# Patient Record
Sex: MALE | Race: WHITE | Employment: OTHER | URBAN - METROPOLITAN AREA
[De-identification: names, ages, dates, MRNs, and addresses within clinical notes are randomized per-mention and may not be internally consistent; named-entity substitution may affect disease eponyms.]

---

## 2017-11-01 ENCOUNTER — HOSPITAL ENCOUNTER (OUTPATIENT)
Dept: OTHER | Age: 77
Discharge: HOME | End: 2017-11-30
Attending: INTERNAL MEDICINE | Admitting: INTERNAL MEDICINE

## 2017-12-01 ENCOUNTER — HOSPITAL ENCOUNTER (OUTPATIENT)
Dept: OTHER | Age: 77
Discharge: HOME | End: 2017-12-31
Attending: INTERNAL MEDICINE | Admitting: INTERNAL MEDICINE

## 2018-01-01 ENCOUNTER — HOSPITAL ENCOUNTER (OUTPATIENT)
Dept: OTHER | Age: 78
Discharge: HOME | End: 2018-01-31
Attending: INTERNAL MEDICINE | Admitting: INTERNAL MEDICINE

## 2018-02-01 ENCOUNTER — HOSPITAL ENCOUNTER (OUTPATIENT)
Dept: OTHER | Age: 78
Discharge: HOME | End: 2018-02-28
Attending: INTERNAL MEDICINE | Admitting: INTERNAL MEDICINE

## 2018-03-01 ENCOUNTER — HOSPITAL ENCOUNTER (OUTPATIENT)
Dept: OTHER | Age: 78
Discharge: HOME | End: 2018-03-31
Attending: INTERNAL MEDICINE | Admitting: INTERNAL MEDICINE

## 2018-04-01 ENCOUNTER — HOSPITAL ENCOUNTER (OUTPATIENT)
Dept: OTHER | Age: 78
Discharge: HOME | End: 2018-04-30
Attending: INTERNAL MEDICINE | Admitting: INTERNAL MEDICINE

## 2018-05-01 ENCOUNTER — HOSPITAL ENCOUNTER (OUTPATIENT)
Dept: OTHER | Age: 78
Discharge: HOME | End: 2018-05-31
Attending: INTERNAL MEDICINE | Admitting: INTERNAL MEDICINE

## 2018-06-01 ENCOUNTER — HOSPITAL ENCOUNTER (OUTPATIENT)
Dept: OTHER | Age: 78
Discharge: HOME | End: 2018-06-30
Attending: INTERNAL MEDICINE | Admitting: INTERNAL MEDICINE

## 2018-07-01 ENCOUNTER — HOSPITAL ENCOUNTER (OUTPATIENT)
Dept: OTHER | Age: 78
Discharge: HOME | End: 2018-07-31
Attending: INTERNAL MEDICINE | Admitting: INTERNAL MEDICINE

## 2018-08-01 ENCOUNTER — HOSPITAL ENCOUNTER (OUTPATIENT)
Dept: OTHER | Age: 78
Discharge: HOME | End: 2018-08-31
Attending: INTERNAL MEDICINE | Admitting: INTERNAL MEDICINE

## 2018-09-01 ENCOUNTER — HOSPITAL ENCOUNTER (OUTPATIENT)
Dept: OTHER | Age: 78
Discharge: HOME | End: 2018-09-01
Attending: INTERNAL MEDICINE | Admitting: INTERNAL MEDICINE

## 2018-09-28 ENCOUNTER — HOSPITAL ENCOUNTER (OUTPATIENT)
Age: 78
Discharge: HOME | End: 2018-09-28

## 2018-09-28 PROCEDURE — 9900000065 HC CARDIAC REHAB PHASE 3 - 1 VISIT

## 2018-10-01 ENCOUNTER — HOSPITAL ENCOUNTER (OUTPATIENT)
Age: 78
Discharge: HOME | End: 2018-10-01

## 2018-10-01 PROCEDURE — 9900000038 HC CARDIAC REHAB PHASE 3 - MONTHLY

## 2018-11-01 ENCOUNTER — HOSPITAL ENCOUNTER (OUTPATIENT)
Age: 78
Discharge: HOME | End: 2018-11-01

## 2018-11-01 PROCEDURE — 9900000038 HC CARDIAC REHAB PHASE 3 - MONTHLY

## 2018-12-15 ENCOUNTER — HOSPITAL ENCOUNTER (OUTPATIENT)
Age: 78
Discharge: HOME | End: 2018-12-15

## 2018-12-15 PROCEDURE — 9900000038 HC CARDIAC REHAB PHASE 3 - MONTHLY

## 2019-01-15 ENCOUNTER — HOSPITAL ENCOUNTER (OUTPATIENT)
Age: 79
Discharge: HOME | End: 2019-01-15

## 2019-01-15 PROCEDURE — 9900000038 HC CARDIAC REHAB PHASE 3 - MONTHLY

## 2019-02-15 ENCOUNTER — HOSPITAL ENCOUNTER (OUTPATIENT)
Age: 79
Discharge: HOME | End: 2019-02-15

## 2019-02-15 PROCEDURE — 9900000038 HC CARDIAC REHAB PHASE 3 - MONTHLY

## 2019-04-05 ENCOUNTER — HOSPITAL ENCOUNTER (OUTPATIENT)
Age: 79
Discharge: HOME | End: 2019-04-05

## 2019-04-05 PROCEDURE — 9900000038 HC CARDIAC REHAB PHASE 3 - MONTHLY

## 2019-04-15 RX ORDER — METFORMIN HYDROCHLORIDE 500 MG/1
500 TABLET, EXTENDED RELEASE ORAL
COMMUNITY

## 2019-04-15 RX ORDER — ATENOLOL 50 MG/1
50 TABLET ORAL DAILY
COMMUNITY

## 2019-04-15 RX ORDER — ROSUVASTATIN CALCIUM 20 MG/1
20 TABLET, COATED ORAL DAILY
COMMUNITY

## 2019-04-15 RX ORDER — CYANOCOBALAMIN 1000 UG/ML
1000 INJECTION INTRAMUSCULAR; SUBCUTANEOUS ONCE
COMMUNITY

## 2019-04-15 NOTE — PROGRESS NOTES
[facility] time____0800________        Surgery time___0900_________    Take the following medications with a sip of water:    Do not eat or drink anything after 12:00 midnight prior to your surgery. EXCEPT PREP  This includes water chewing gum, mints and ice chips. You may brush your teeth and gargle the morning of your surgery, but do not swallow the water      You may be asked to stop blood thinners such as Coumadin, Plavix, Fragmin, Lovenox, etc., or any anti-inflammatories such as:  Aspirin, Ibuprofen, Advil, Naproxen prior to your surgery. We also ask that you stop any OTC medications such as fish oil, vitamin E, glucosamine, garlic, Multivitamins, COQ 10, etc.    We ask that you do not smoke 24 hours prior to surgery  We ask that you do not  drink any alcoholic beverages 24 hours prior to surgery     You must make arrangements for a responsible adult to take you home after your surgery. For your safety you will not be allowed to leave alone or drive yourself home. Your surgery will be cancelled if you do not have a ride home. Also for your safety, it is strongly suggested that someone stay with you the first 24 hours after your surgery. A parent or legal guardian must accompany a child scheduled for surgery and plan to stay at the hospital until the child is discharged. Please do not bring other children with you. For your comfort, please wear simple loose fitting clothing to the hospital.  Please do not bring valuables. Do not wear any make-up or nail polish on your fingers or toes      For your safety, please do not wear any jewelry or body piercing's on the day of surgery. All jewelry must be removed. If you have dentures, they will be removed before going to operating room. For your convenience, we will provide you with a container.     If you wear contact lenses or glasses, they will be removed, please bring a case for them.     If you have a living will and a durable power of  for healthcare, please bring in a copy. As part of our patient safety program to minimize surgical site infections, we ask you to do the following:    · Please notify your surgeon if you develop any illness between         now and the  day of your surgery. · This includes a cough, cold, fever, sore throat, nausea,         or vomiting, and diarrhea, etc.  ·  Please notify your surgeon if you experience dizziness, shortness         of breath or blurred vision between now and the time of your surgery. You may shower the night before surgery or the morning of   your surgery with an antibacterial soap. You will need to bring a photo ID and insurance card    [facility] has an onsite pharmacy, would you like to utilize our pharmacy     If you will be staying overnight and use a C-pap machine, please bring   your C-pap to hospital     Our goal is to provide you with excellent care, therefore, visitors will be limited to two(2) in the room at a time so that we may focus on providing this care for you. Please contact pre-admission testing if you have any further questions. [facility] phone number:  [phone]  Please note these are generalized instructions for all surgical cases, you may be provided with more specific instructions according to your surgery.

## 2019-04-16 ENCOUNTER — ANESTHESIA EVENT (OUTPATIENT)
Dept: ENDOSCOPY | Age: 79
End: 2019-04-16
Payer: MEDICARE

## 2019-04-17 ENCOUNTER — ANESTHESIA (OUTPATIENT)
Dept: ENDOSCOPY | Age: 79
End: 2019-04-17
Payer: MEDICARE

## 2019-04-17 ENCOUNTER — HOSPITAL ENCOUNTER (OUTPATIENT)
Age: 79
Setting detail: OUTPATIENT SURGERY
Discharge: HOME | End: 2019-04-17
Attending: INTERNAL MEDICINE | Admitting: INTERNAL MEDICINE
Payer: MEDICARE

## 2019-04-17 VITALS
HEIGHT: 70 IN | OXYGEN SATURATION: 98 % | WEIGHT: 164.6 LBS | HEART RATE: 59 BPM | RESPIRATION RATE: 18 BRPM | SYSTOLIC BLOOD PRESSURE: 154 MMHG | BODY MASS INDEX: 23.56 KG/M2 | DIASTOLIC BLOOD PRESSURE: 59 MMHG | TEMPERATURE: 97.5 F

## 2019-04-17 VITALS
SYSTOLIC BLOOD PRESSURE: 109 MMHG | RESPIRATION RATE: 22 BRPM | OXYGEN SATURATION: 100 % | DIASTOLIC BLOOD PRESSURE: 65 MMHG

## 2019-04-17 DIAGNOSIS — Z12.11 SCREEN FOR COLON CANCER: ICD-10-CM

## 2019-04-17 LAB
GLUCOSE BLD-MCNC: 115 MG/DL (ref 70–99)
PERFORMED ON: ABNORMAL

## 2019-04-17 PROCEDURE — 2580000003 HC RX 258: Performed by: ANESTHESIOLOGY

## 2019-04-17 PROCEDURE — 6360000002 HC RX W HCPCS: Performed by: NURSE ANESTHETIST, CERTIFIED REGISTERED

## 2019-04-17 PROCEDURE — 3609010300 HC COLONOSCOPY W/BIOPSY SINGLE/MULTIPLE: Performed by: INTERNAL MEDICINE

## 2019-04-17 PROCEDURE — 7100000010 HC PHASE II RECOVERY - FIRST 15 MIN: Performed by: INTERNAL MEDICINE

## 2019-04-17 PROCEDURE — 2500000003 HC RX 250 WO HCPCS: Performed by: NURSE ANESTHETIST, CERTIFIED REGISTERED

## 2019-04-17 PROCEDURE — 2709999900 HC NON-CHARGEABLE SUPPLY: Performed by: INTERNAL MEDICINE

## 2019-04-17 PROCEDURE — 88305 TISSUE EXAM BY PATHOLOGIST: CPT

## 2019-04-17 PROCEDURE — 7100000011 HC PHASE II RECOVERY - ADDTL 15 MIN: Performed by: INTERNAL MEDICINE

## 2019-04-17 PROCEDURE — 3700000000 HC ANESTHESIA ATTENDED CARE: Performed by: INTERNAL MEDICINE

## 2019-04-17 PROCEDURE — 3700000001 HC ADD 15 MINUTES (ANESTHESIA): Performed by: INTERNAL MEDICINE

## 2019-04-17 RX ORDER — FENTANYL CITRATE 50 UG/ML
25 INJECTION, SOLUTION INTRAMUSCULAR; INTRAVENOUS EVERY 5 MIN PRN
Status: DISCONTINUED | OUTPATIENT
Start: 2019-04-17 | End: 2019-04-17 | Stop reason: HOSPADM

## 2019-04-17 RX ORDER — ONDANSETRON 2 MG/ML
4 INJECTION INTRAMUSCULAR; INTRAVENOUS
Status: DISCONTINUED | OUTPATIENT
Start: 2019-04-17 | End: 2019-04-17 | Stop reason: HOSPADM

## 2019-04-17 RX ORDER — MEPERIDINE HYDROCHLORIDE 25 MG/ML
12.5 INJECTION INTRAMUSCULAR; INTRAVENOUS; SUBCUTANEOUS EVERY 5 MIN PRN
Status: DISCONTINUED | OUTPATIENT
Start: 2019-04-17 | End: 2019-04-17 | Stop reason: HOSPADM

## 2019-04-17 RX ORDER — OXYCODONE HYDROCHLORIDE AND ACETAMINOPHEN 5; 325 MG/1; MG/1
1 TABLET ORAL PRN
Status: DISCONTINUED | OUTPATIENT
Start: 2019-04-17 | End: 2019-04-17 | Stop reason: HOSPADM

## 2019-04-17 RX ORDER — FENTANYL CITRATE 50 UG/ML
50 INJECTION, SOLUTION INTRAMUSCULAR; INTRAVENOUS EVERY 5 MIN PRN
Status: DISCONTINUED | OUTPATIENT
Start: 2019-04-17 | End: 2019-04-17 | Stop reason: HOSPADM

## 2019-04-17 RX ORDER — MORPHINE SULFATE 4 MG/ML
1 INJECTION, SOLUTION INTRAMUSCULAR; INTRAVENOUS EVERY 5 MIN PRN
Status: DISCONTINUED | OUTPATIENT
Start: 2019-04-17 | End: 2019-04-17 | Stop reason: HOSPADM

## 2019-04-17 RX ORDER — OXYCODONE HYDROCHLORIDE AND ACETAMINOPHEN 5; 325 MG/1; MG/1
2 TABLET ORAL PRN
Status: DISCONTINUED | OUTPATIENT
Start: 2019-04-17 | End: 2019-04-17 | Stop reason: HOSPADM

## 2019-04-17 RX ORDER — PROPOFOL 10 MG/ML
INJECTION, EMULSION INTRAVENOUS PRN
Status: DISCONTINUED | OUTPATIENT
Start: 2019-04-17 | End: 2019-04-17 | Stop reason: SDUPTHER

## 2019-04-17 RX ORDER — MORPHINE SULFATE 4 MG/ML
2 INJECTION, SOLUTION INTRAMUSCULAR; INTRAVENOUS EVERY 5 MIN PRN
Status: DISCONTINUED | OUTPATIENT
Start: 2019-04-17 | End: 2019-04-17 | Stop reason: HOSPADM

## 2019-04-17 RX ORDER — SODIUM CHLORIDE 0.9 % (FLUSH) 0.9 %
10 SYRINGE (ML) INJECTION PRN
Status: DISCONTINUED | OUTPATIENT
Start: 2019-04-17 | End: 2019-04-17 | Stop reason: HOSPADM

## 2019-04-17 RX ORDER — SODIUM CHLORIDE 9 MG/ML
INJECTION, SOLUTION INTRAVENOUS CONTINUOUS
Status: DISCONTINUED | OUTPATIENT
Start: 2019-04-17 | End: 2019-04-17 | Stop reason: HOSPADM

## 2019-04-17 RX ORDER — LIDOCAINE HYDROCHLORIDE 20 MG/ML
INJECTION, SOLUTION EPIDURAL; INFILTRATION; INTRACAUDAL; PERINEURAL PRN
Status: DISCONTINUED | OUTPATIENT
Start: 2019-04-17 | End: 2019-04-17 | Stop reason: SDUPTHER

## 2019-04-17 RX ORDER — SODIUM CHLORIDE 0.9 % (FLUSH) 0.9 %
10 SYRINGE (ML) INJECTION EVERY 12 HOURS SCHEDULED
Status: DISCONTINUED | OUTPATIENT
Start: 2019-04-17 | End: 2019-04-17 | Stop reason: HOSPADM

## 2019-04-17 RX ADMIN — LIDOCAINE HYDROCHLORIDE 20 MG: 20 INJECTION, SOLUTION EPIDURAL; INFILTRATION; INTRACAUDAL; PERINEURAL at 08:46

## 2019-04-17 RX ADMIN — PROPOFOL 40 MG: 10 INJECTION, EMULSION INTRAVENOUS at 08:52

## 2019-04-17 RX ADMIN — PROPOFOL 40 MG: 10 INJECTION, EMULSION INTRAVENOUS at 08:46

## 2019-04-17 RX ADMIN — SODIUM CHLORIDE: 0.9 INJECTION, SOLUTION INTRAVENOUS at 08:32

## 2019-04-17 RX ADMIN — LIDOCAINE HYDROCHLORIDE 40 MG: 20 INJECTION, SOLUTION EPIDURAL; INFILTRATION; INTRACAUDAL; PERINEURAL at 08:43

## 2019-04-17 RX ADMIN — PROPOFOL 40 MG: 10 INJECTION, EMULSION INTRAVENOUS at 08:49

## 2019-04-17 RX ADMIN — PROPOFOL 80 MG: 10 INJECTION, EMULSION INTRAVENOUS at 08:43

## 2019-04-17 RX ADMIN — LIDOCAINE HYDROCHLORIDE 20 MG: 20 INJECTION, SOLUTION EPIDURAL; INFILTRATION; INTRACAUDAL; PERINEURAL at 08:52

## 2019-04-17 RX ADMIN — LIDOCAINE HYDROCHLORIDE 20 MG: 20 INJECTION, SOLUTION EPIDURAL; INFILTRATION; INTRACAUDAL; PERINEURAL at 08:49

## 2019-04-17 ASSESSMENT — PAIN SCALES - GENERAL
PAINLEVEL_OUTOF10: 0

## 2019-04-17 ASSESSMENT — LIFESTYLE VARIABLES: SMOKING_STATUS: 1

## 2019-04-17 ASSESSMENT — PAIN - FUNCTIONAL ASSESSMENT: PAIN_FUNCTIONAL_ASSESSMENT: 0-10

## 2019-04-17 ASSESSMENT — PAIN SCALES - WONG BAKER: WONGBAKER_NUMERICALRESPONSE: 0

## 2019-04-17 NOTE — OP NOTE
Colonoscopy Procedure Note      Patient: Selena Kaur  : 1940  Acct#:     Procedure: Colonoscopy with polypectomy (cold biopsy)    Date:  2019    Surgeon:  Solange Banuelos DO    Referring Physician:  Johnathan Núñez MD    Previous Colonoscopy: YES  Date:   Greater than 3 years: YES    Preoperative Diagnosis: History of colon polyps. Surveillance colonoscopy. Postoperative Diagnosis:    1) A 3 mm polyp in the cecum was removed completely with biopsy polypectomy. 2) A 3 mm polyp in the cecum was removed completely with biopsy polypectomy. 3) A 2 mm polyp in the cecum was removed completely with biopsy polypectomy. 4) A 3 mm polyp in the ascending colon was removed completely with biopsy polypectomy. 5) A 2 mm polyp in the transverse colon was removed completely with biopsy polypectomy. 6) Mild right and left sided diverticulosis was noted  7) Moderate internal hemorrhoids were noted. Consent:  The patient or their legal guardian has signed a consent, and is aware of the potential risks, benefits, alternatives, and potential complications of this procedure. These include, but are not limited to hemorrhage, bleeding, post procedural pain, perforation, phlebitis, aspiration, hypotension, hypoxia, cardiovascular events such as arryhthmia, and possibly death. Additionally, the possibility of missed colonic polyps and interval colon cancer was discussed in the consent. Anesthesia:  The patient was administered TIVA per anesthesiology team.  Please see their operative records for full details of medications administered. Procedure: An informed consent was obtained from the patient after explanation of indications, benefits, possible risks and complications of the procedure. The patient was then taken to the endoscopy suite, placed in the left lateral decubitus position, and the above IV anesthesia was administered.     A digital rectal examination was performed and revealed negative without mass, lesions or tenderness, internal hemorrhoids noted. The Olympus video colonoscope was placed in the patient's rectum under digital direction and advanced to the cecum. The cecum was identified by characteristic anatomy and ballottment. The ileocecal valve was identified. The preparation was good. The terminal ileum was normal.    The scope was then withdrawn back through the cecum, ascending, transverse, descending, sigmoid colon, and rectum. Careful circumferential examination of the mucosa in these areas demonstrated:      1) A 3 mm polyp in the cecum was removed completely with biopsy polypectomy. 2) A 3 mm polyp in the cecum was removed completely with biopsy polypectomy. 3) A 2 mm polyp in the cecum was removed completely with biopsy polypectomy. 4) A 3 mm polyp in the ascending colon was removed completely with biopsy polypectomy. 5) A 2 mm polyp in the transverse colon was removed completely with biopsy polypectomy. 6) Mild right and left sided diverticulosis was noted      The scope was then withdrawn into the rectum and retroflexed. The retroflexed view of the anal verge and rectum demonstrates moderate internal hemorrhoids. The scope was straightened, the colon was decompressed and the scope was withdrawn from the patient. The patient tolerated the procedure well and was taken to the PACU in good condition. Estimated blood loss: None    Impression:  See post-procedure diagnoses. Recommendations: The patient had colon polyp(s) successfully removed today. Eboni Bermudezy is a small risk for these sites to bleed for up to several weeks out from the procedure. The patient is instructed to call if there is any significant amounts of  rectal bleeding, abdominal pain, dizziness, or other complaints thought to be related to the procedure. The patient is recommended to have a repeat colonoscopy in 3 or 5 years.   This will be determined after the biopsies are reviewed.       Agnieszka More, DO  [address] [place] [address] [facility] [place]  4/17/2019  [phone]

## 2019-04-17 NOTE — ANESTHESIA PRE PROCEDURE
Department of Anesthesiology  Preprocedure Note       Name:  Misbah Rowe   Age:  69 y.o.  :  1940                                          MRN:  [id number]         Date:  2019      Surgeon: Lieutenant Bhatia):  Los Barth, DO    Procedure: COLORECTAL CANCER SCREENING, NOT HIGH RISK (N/A )    Medications prior to admission:   Prior to Admission medications    Medication Sig Start Date End Date Taking? Authorizing Provider   atenolol (TENORMIN) 50 MG tablet Take 50 mg by mouth daily   Yes Historical Provider, MD   rosuvastatin (CRESTOR) 20 MG tablet Take 20 mg by mouth daily   Yes Historical Provider, MD   metFORMIN (GLUCOPHAGE-XR) 500 MG extended release tablet Take 500 mg by mouth daily (with breakfast)   Yes Historical Provider, MD   cyanocobalamin 1000 MCG/ML injection Inject 1,000 mcg into the muscle once Indications: INJ MONTHLY   Yes Historical Provider, MD       Current medications:    No current facility-administered medications for this encounter. Current Outpatient Medications   Medication Sig Dispense Refill    atenolol (TENORMIN) 50 MG tablet Take 50 mg by mouth daily      rosuvastatin (CRESTOR) 20 MG tablet Take 20 mg by mouth daily      metFORMIN (GLUCOPHAGE-XR) 500 MG extended release tablet Take 500 mg by mouth daily (with breakfast)      cyanocobalamin 1000 MCG/ML injection Inject 1,000 mcg into the muscle once Indications: INJ MONTHLY         Allergies:  No Known Allergies    Problem List:  There is no problem list on file for this patient. Past Medical History:        Diagnosis Date    CAD (coronary artery disease)     Diabetes mellitus ([facility] [address]     Hyperlipidemia        Past Surgical History:        Procedure Laterality Date    CARDIAC SURGERY         Social History:    Social History     Tobacco Use    Smoking status: Current Some Day Smoker     Types: Cigars    Smokeless tobacco: Never Used   Substance Use Topics    Alcohol use:  Yes

## 2019-04-17 NOTE — ANESTHESIA POSTPROCEDURE EVALUATION
Department of Anesthesiology  Postprocedure Note    Patient: Alejandro Kellogg  MRN: [id number]  YOB: 1940  Date of evaluation: 4/17/2019  Time:  1:09 PM     Procedure Summary     Date:  04/17/19 Room / Location:  [facility] ENDO 01 / [facility] ENDOSCOPY    Anesthesia Start:  0840 Anesthesia Stop:  0908    Procedure:  COLONOSCOPY WITH BIOPSY (N/A ) Diagnosis:       Screen for colon cancer      (SCREENING)    Surgeon:  Charles Marmolejo DO Responsible Provider:  April Lopez MD    Anesthesia Type:  TIVA ASA Status:  3          Anesthesia Type: TIVA    Willis Phase I: Willis Score: 10    Willis Phase II: Willis Score: 10    Last vitals: Reviewed and per EMR flowsheets.    Vitals:    04/17/19 0824 04/17/19 0907 04/17/19 0922 04/17/19 0928   BP: (!) 150/65 (!) 110/56 139/76 (!) 154/59   Pulse: 58 52 61 59   Resp: 18 16 18 18   Temp: 97.4 °F (36.3 °C) 97.5 °F (36.4 °C)     TempSrc: Tympanic Temporal     SpO2: 96% 98% 96% 98%   Weight: 164 lb 9.6 oz (74.7 kg)      Height: 5' 10\" (1.778 m)          Anesthesia Post Evaluation    Patient location during evaluation: bedside  Patient participation: complete - patient participated  Level of consciousness: awake and alert  Airway patency: patent  Nausea & Vomiting: no nausea  Complications: no  Cardiovascular status: hemodynamically stable  Respiratory status: acceptable  Hydration status: euvolemic

## 2019-04-17 NOTE — H&P
Pre-operative History and Physical    Patient: Ramila Beaver  : 1940  Acct#:     Intended Procedure:  Colonoscopy    HISTORY OF PRESENT ILLNESS:  The patient is a 69 y.o. male  who presents for/due to History of colon polyps. Surveillance colonoscopy. Past Medical History:        Diagnosis Date    CAD (coronary artery disease)     Diabetes mellitus ([facility] [address]     Hyperlipidemia      Past Surgical History:        Procedure Laterality Date    CARDIAC SURGERY       Medications Prior to Admission:   No current facility-administered medications on file prior to encounter. Current Outpatient Medications on File Prior to Encounter   Medication Sig Dispense Refill    atenolol (TENORMIN) 50 MG tablet Take 50 mg by mouth daily      rosuvastatin (CRESTOR) 20 MG tablet Take 20 mg by mouth daily      metFORMIN (GLUCOPHAGE-XR) 500 MG extended release tablet Take 500 mg by mouth daily (with breakfast)      cyanocobalamin 1000 MCG/ML injection Inject 1,000 mcg into the muscle once Indications: INJ MONTHLY          Allergies:  Patient has no known allergies. Social History:   TOBACCO:   reports that he has been smoking cigars. He has never used smokeless tobacco.  ETOH:   reports that he drinks alcohol. DRUGS:   has no drug history on file. PHYSICAL EXAM:      Vital Signs: BP (!) 150/65   Pulse 58   Temp 97.4 °F (36.3 °C) (Tympanic)   Resp 18   Ht 5' 10\" (1.778 m)   Wt 164 lb 9.6 oz (74.7 kg)   SpO2 96%   BMI 23.62 kg/m²    Airway: No stridor or wheezing noted. Good air movement  Pulmonary: without wheezes. Clear to auscultation  Cardiac:regular rate and rhythm without loud murmurs  Abdomen:soft, nontender,  Bowel sounds present    Pre-Procedure Assessment / Plan:  1) History of colon polyps. Surveillance colonoscopy.        ASA Grade:  ASA 2 - Patient with mild systemic disease with no functional limitations  Mallampati Classification:  Class II    Level of Sedation Plan:Deep sedation    Post Procedure plan: Return to same level of care    I assessed the patient and find that the patient is in satisfactory condition to proceed with the planned procedure and sedation plan. I have explained the risk, benefits, and alternatives to the procedure; the patient understands and agrees to proceed.        Samanta Alcaraz DO  4/17/2019

## 2019-08-03 PROCEDURE — 9900000038 HC CARDIAC REHAB PHASE 3 - MONTHLY

## 2019-08-26 ENCOUNTER — HOSPITAL ENCOUNTER (OUTPATIENT)
Dept: CARDIAC REHAB | Age: 79
Discharge: HOME | End: 2019-08-26

## 2025-01-01 ENCOUNTER — HOSPITAL ENCOUNTER (EMERGENCY)
Age: 85
End: 2025-02-21
Attending: STUDENT IN AN ORGANIZED HEALTH CARE EDUCATION/TRAINING PROGRAM
Payer: MEDICARE

## 2025-01-01 VITALS — TEMPERATURE: 90.3 F

## 2025-01-01 DIAGNOSIS — I46.9 CARDIOPULMONARY ARREST (HCC): Primary | ICD-10-CM

## 2025-01-01 LAB
ALBUMIN SERPL-MCNC: 3.8 G/DL (ref 3.4–5)
ALBUMIN/GLOB SERPL: 1.4 {RATIO} (ref 1.1–2.2)
ALP SERPL-CCNC: 106 U/L (ref 40–129)
ALT SERPL-CCNC: ABNORMAL U/L (ref 10–40)
ANION GAP SERPL CALCULATED.3IONS-SCNC: 34 MMOL/L (ref 3–16)
AST SERPL-CCNC: 3272 U/L (ref 15–37)
BASOPHILS # BLD: 0 K/UL (ref 0–0.2)
BASOPHILS NFR BLD: 0 %
BILIRUB SERPL-MCNC: 0.4 MG/DL (ref 0–1)
BUN SERPL-MCNC: 20 MG/DL (ref 7–20)
CALCIUM SERPL-MCNC: 10.1 MG/DL (ref 8.3–10.6)
CHLORIDE SERPL-SCNC: 98 MMOL/L (ref 99–110)
CO2 SERPL-SCNC: 8 MMOL/L (ref 21–32)
COHGB MFR BLDV: 8.1 %
CREAT SERPL-MCNC: 1.4 MG/DL (ref 0.8–1.3)
DEPRECATED RDW RBC AUTO: 16.5 % (ref 12.4–15.4)
EOSINOPHIL # BLD: 0.2 K/UL (ref 0–0.6)
EOSINOPHIL NFR BLD: 2 %
GFR SERPLBLD CREATININE-BSD FMLA CKD-EPI: 50 ML/MIN/{1.73_M2}
GLUCOSE BLD-MCNC: 199 MG/DL (ref 70–99)
GLUCOSE SERPL-MCNC: 219 MG/DL (ref 70–99)
HCT VFR BLD AUTO: 52.7 % (ref 40.5–52.5)
HGB BLD-MCNC: 15.6 G/DL (ref 13.5–17.5)
LYMPHOCYTES # BLD: 5.1 K/UL (ref 1–5.1)
LYMPHOCYTES NFR BLD: 57 %
MACROCYTES BLD QL SMEAR: ABNORMAL
MCH RBC QN AUTO: 33.3 PG (ref 26–34)
MCHC RBC AUTO-ENTMCNC: 29.6 G/DL (ref 31–36)
MCV RBC AUTO: 112.7 FL (ref 80–100)
METHGB MFR BLDV: 0.9 %
MONOCYTES # BLD: 0.1 K/UL (ref 0–1.3)
MONOCYTES NFR BLD: 1 %
MYELOCYTES NFR BLD MANUAL: 2 %
NEUTROPHILS # BLD: 2.6 K/UL (ref 1.7–7.7)
NEUTROPHILS NFR BLD: 30 %
NEUTS BAND NFR BLD MANUAL: 1 % (ref 0–7)
O2 THERAPY: ABNORMAL
PATH INTERP BLD-IMP: YES
PCO2 BLDV: 102 MMHG (ref 40–50)
PERFORMED ON: ABNORMAL
PH BLDV: <6.818 [PH] (ref 7.35–7.45)
PLATELET # BLD AUTO: 202 K/UL (ref 135–450)
PMV BLD AUTO: 8.9 FL (ref 5–10.5)
PO2 BLDV: <30 MMHG
POTASSIUM SERPL-SCNC: ABNORMAL MMOL/L (ref 3.5–5.1)
PROT SERPL-MCNC: 6.5 G/DL (ref 6.4–8.2)
RBC # BLD AUTO: 4.68 M/UL (ref 4.2–5.9)
SAO2 % BLDV: 24 %
SODIUM SERPL-SCNC: 140 MMOL/L (ref 136–145)
TROPONIN, HIGH SENSITIVITY: 185 NG/L (ref 0–22)
VARIANT LYMPHS NFR BLD MANUAL: 7 % (ref 0–6)
WBC # BLD AUTO: 8 K/UL (ref 4–11)

## 2025-01-01 PROCEDURE — 84484 ASSAY OF TROPONIN QUANT: CPT

## 2025-01-01 PROCEDURE — 36556 INSERT NON-TUNNEL CV CATH: CPT

## 2025-01-01 PROCEDURE — 99291 CRITICAL CARE FIRST HOUR: CPT

## 2025-01-01 PROCEDURE — 99285 EMERGENCY DEPT VISIT HI MDM: CPT

## 2025-01-01 PROCEDURE — 92950 HEART/LUNG RESUSCITATION CPR: CPT

## 2025-01-01 PROCEDURE — 80053 COMPREHEN METABOLIC PANEL: CPT

## 2025-01-01 PROCEDURE — 85025 COMPLETE CBC W/AUTO DIFF WBC: CPT

## 2025-01-01 PROCEDURE — 6360000002 HC RX W HCPCS: Performed by: STUDENT IN AN ORGANIZED HEALTH CARE EDUCATION/TRAINING PROGRAM

## 2025-01-01 PROCEDURE — 2500000003 HC RX 250 WO HCPCS: Performed by: STUDENT IN AN ORGANIZED HEALTH CARE EDUCATION/TRAINING PROGRAM

## 2025-01-01 PROCEDURE — 31500 INSERT EMERGENCY AIRWAY: CPT

## 2025-01-01 PROCEDURE — 82803 BLOOD GASES ANY COMBINATION: CPT

## 2025-01-01 RX ORDER — EPINEPHRINE IN SOD CHLOR,ISO 1 MG/10 ML
SYRINGE (ML) INTRAVENOUS DAILY PRN
Status: COMPLETED | OUTPATIENT
Start: 2025-01-01 | End: 2025-01-01

## 2025-01-01 RX ORDER — CALCIUM CHLORIDE 100 MG/ML
INJECTION INTRAVENOUS; INTRAVENTRICULAR DAILY PRN
Status: COMPLETED | OUTPATIENT
Start: 2025-01-01 | End: 2025-01-01

## 2025-01-01 RX ORDER — AMIODARONE HYDROCHLORIDE 150 MG/3ML
INJECTION, SOLUTION INTRAVENOUS DAILY PRN
Status: COMPLETED | OUTPATIENT
Start: 2025-01-01 | End: 2025-01-01

## 2025-01-01 RX ADMIN — CALCIUM CHLORIDE 1000 MG: 100 INJECTION, SOLUTION INTRAVENOUS; INTRAVENTRICULAR at 16:16

## 2025-01-01 RX ADMIN — EPINEPHRINE 1 MG: 0.1 INJECTION INTRACARDIAC; INTRAVENOUS at 16:28

## 2025-01-01 RX ADMIN — EPINEPHRINE 1 MG: 0.1 INJECTION INTRACARDIAC; INTRAVENOUS at 16:41

## 2025-01-01 RX ADMIN — EPINEPHRINE 1 MG: 0.1 INJECTION INTRACARDIAC; INTRAVENOUS at 16:51

## 2025-01-01 RX ADMIN — SODIUM BICARBONATE 50 MEQ: 84 INJECTION INTRAVENOUS at 16:17

## 2025-01-01 RX ADMIN — AMIODARONE HYDROCHLORIDE 300 MG: 50 INJECTION, SOLUTION INTRAVENOUS at 16:27

## 2025-01-01 RX ADMIN — EPINEPHRINE 1 MG: 0.1 INJECTION INTRACARDIAC; INTRAVENOUS at 16:32

## 2025-01-01 RX ADMIN — EPINEPHRINE 1 MG: 0.1 INJECTION INTRACARDIAC; INTRAVENOUS at 16:44

## 2025-01-01 RX ADMIN — EPINEPHRINE 1 MG: 0.1 INJECTION INTRACARDIAC; INTRAVENOUS at 16:15

## 2025-01-01 RX ADMIN — EPINEPHRINE 1 MG: 0.1 INJECTION INTRACARDIAC; INTRAVENOUS at 16:38

## 2025-01-01 RX ADMIN — SODIUM BICARBONATE 50 MEQ: 84 INJECTION INTRAVENOUS at 16:52

## 2025-01-01 RX ADMIN — EPINEPHRINE 1 MG: 0.1 INJECTION INTRACARDIAC; INTRAVENOUS at 16:47

## 2025-01-01 RX ADMIN — EPINEPHRINE 1 MG: 0.1 INJECTION INTRACARDIAC; INTRAVENOUS at 16:35

## 2025-01-01 RX ADMIN — EPINEPHRINE 1 MG: 0.1 INJECTION INTRACARDIAC; INTRAVENOUS at 16:18

## 2025-01-01 RX ADMIN — CALCIUM CHLORIDE 1000 MG: 100 INJECTION, SOLUTION INTRAVENOUS; INTRAVENTRICULAR at 16:51

## 2025-01-01 RX ADMIN — EPINEPHRINE 1 MG: 0.1 INJECTION INTRACARDIAC; INTRAVENOUS at 16:22

## 2025-01-01 RX ADMIN — EPINEPHRINE 1 MG: 0.1 INJECTION INTRACARDIAC; INTRAVENOUS at 16:25

## 2025-01-01 RX ADMIN — EPINEPHRINE 1 MG: 0.1 INJECTION INTRACARDIAC; INTRAVENOUS at 16:53

## 2025-02-21 NOTE — ED PROVIDER NOTES
This is my TORITO Supervisory and shared visit note:     This patient was seen by the advanced practice provider.     I personally saw the patient and made/approved the management plan and take responsibility for the patient management.     83-year-old male presenting to the emergency department for cardiac arrest.  EMS reports that patient does have a cardiac history and prior history of CABG.  Patient was found down outside.  Unknown amount of downtime prior to EMS arrival.  Wife found patient outside after coming home from a .    Focused exam:   Gen: GCS 3  HEENT: No obvious trauma to head and neck region  CV: Actively receiving CPR with Sunny device  Lungs: Bagged ventilated with Igel in place  Abdomen: Abdomen distended  Neuro: GCS of 3, pupils fixed and dilated    MDM:   Patient is a 84-year-old male presenting to the emergency department in cardiac arrest.  EMS reports that wife came home and found patient down outside without a pulse.  Prior to arrival to the emergency department EMS had done 25 minutes of resuscitation which included CPR, 4 rounds of epinephrine, and 1 amp of sodium bicarb.  Patient has remained in asystole.  Unable to register temperature with rectal thermometer on arrival.  CPR continued and ACLS fashion including rewarming efforts.  IO in place on arrival.  Additional IV access established which included peripheral IV and right [place] central line.  Please see TORITO note for further details.  Patient was also intubated on arrival to emergency department.  7.5 ETT at 25 cm at the lip.  Please see TORITO note for further details.  Patient was also administered warmed crystalloid fluids and placed on warm blankets in addition to Ernie hugger.  Patient remained asystole for the overwhelming duration of code.  Patient was administered 2 A of sodium bicarb and multiple rounds of epinephrine.  Patient was also administered calcium chloride.  After significant rewarming efforts over an hour and ACLS

## 2025-02-21 NOTE — ED NOTES
Call placed to [facility] at this time spoke with staff DANNY. Potential donor case, Ref #[id number]. [facility] would like a call back after family leaves, and decision on  case

## 2025-02-21 NOTE — ED NOTES
Coroners office contacting this RN with continued questions. Call transferred to ED MD phone Per Dr. Washington request

## 2025-02-21 NOTE — ED NOTES
Call returned by Sarah at coroners office. After speaking with ED staff patient will be coroners case. EDMD aware. Family has not been to bedside at this time

## 2025-02-21 NOTE — PROGRESS NOTES
Spiritual Health History and Assessment/Progress Note  [facility]    (P) Grief, Loss, and Adjustments,  , (P) Death, Bereavement Care,      Name: Moncho Win MRN: [id number]    Age: 84 y.o.     Sex: male   Language: English   Zoroastrianism: Taoism   <principal problem not specified>     Date: 2025            Total Time Calculated: (P) 10 min              Spiritual Assessment began in [facility] EMERGENCY DEPARTMENT        Referral/Consult From: (P) Nurse   Encounter Overview/Reason: (P) Grief, Loss, and Adjustments  Service Provided For: (P) Family    Joelle, Belief, Meaning:   Patient unable to assess at this time  Family/Friends are connected with a joelle tradition or spiritual practice and have beliefs or practices that help with coping during difficult times      Importance and Influence:  Patient unable to assess at this time  Family/Friends have spiritual/personal beliefs that influence decisions regarding the patient's health    Community:  Patient Other: Unable to assess  Family/Friends Other: Family supportive of one another, connected with local [place]    Assessment and Plan of Care:   Patient Interventions include: Other: none   Family/Friends Interventions include: Facilitated expression of thoughts and feelings, Explored spiritual coping/struggle/distress, and Other: Prayer with family, gave  home to Kacy Florez.    Patient Plan of Care: No spiritual needs identified for follow-up  Family/Friends Plan of Care: Other: No spiritual needs for follow up    Electronically signed by SANIA Quach on 2025 at 5:42 PM

## 2025-02-21 NOTE — ED TRIAGE NOTES
Patient arrived via [facility] with CPR in progress.  He was found down by his wife outside and squad was called.  Bystander CPR was started at 1541.  4 Epi and 1 Bicarb given via IO en route.  Patient with iGel in place on arrival also with ventilations.  Patient was asystole for the EMS entire time.  Upon arrival to the ED patient was transferred to ED bed with Sunny device in place and resuscitation continued, see code narrator.  Patient's wife had ridden with the squad and was in the lobby.  She was taken to room 4 and MD in to update.

## 2025-02-21 NOTE — ED NOTES
Call placed to coroners office at this time. All questions answered, coroners staff states they will call back to ED with coroners update decision. Call back number provided

## 2025-02-21 NOTE — ED PROVIDER NOTES
[facility] EMERGENCY DEPARTMENT  [address] [facility] [address]  Dept: [phone]  Loc: [phone]  eMERGENCYdEPARTMENT eNCOUnter      Pt Name: Moncho Win  MRN: [id number]  Birthdate 1940  Date of evaluation: 2/21/2025  Provider:SINDHU Brady CNP      Seen by MD and TORITO  CHIEF COMPLAINT       Chief Complaint   Patient presents with    Cardiac Arrest       CRITICAL CARE TIME   Because of high probability of sudden clinical deterioration of the patient's condition or  further deterioration, critical care time included my full attention to the patient's condition, including chart data review, documentation, medication ordering, reviewing the patient's old records, reevaluation patient's cardiac, pulmonary and neurological status. Reassessment of vital signs. Consultations with ED attending and admitting physician. Ordering, interpreting reviewing diagnostic testing. Therefore, my critical care time was 60 minutes of direct attention to the patient's condition did not include time spent on procedures.      HISTORY OF PRESENT ILLNESS  (Location/Symptom, Timing/Onset, Context/Setting, Quality, Duration,Modifying Factors, Severity.)   Moncho Win is a 84 y.o. male who presents to the emergency department PMHx: CAD, DM, hyperlipidemia, PTCA      Lives at home with wife  CODE STATUS full    HPI provided by EMS and by patient's wife, also some contribution from the patient's son:    911 called by the wife when she arrived back to the home after being out on lunch with her friends.  She found him outside laying next to lean against the wall on the ground.  He did not have a pulse and was not responsive.  On EMS arrival 1541 they initiated ACLS protocol and CPR with airway management: Patient had an LMA on arrival, was pulseless, bag-valve-mask via LMA, had already received multiple rounds of epi.  Right lower extremity IO in place per EMS.     In  speaking with the family: The son reported that his father had an appointment here at the hospital today.  And that he had spoken with his father on the home phone sometime around 1 30-1 30 5 PM.  He reported that his father had had concerns that he had lost his mobile cell phone.  The wife was not home at the time.  From that point the son said that he had called the house phone several times and was only making an assumption that his dad had gone outside to get some wood out of the truck and/or look for his cell phone.        Nursing Notes were reviewedand agreed with or any disagreements were addressed in the HPI.    REVIEW OF SYSTEMS    (2-9 systems for level 4, 10 or more for level 5)     Review of Systems   Unable to perform ROS: Patient unresponsive       Except as noted above the remainder of the review of systems was reviewed and negative.       PAST MEDICAL HISTORY         Diagnosis Date    CAD (coronary artery disease)     Diabetes mellitus (HCC)     Hyperlipidemia        SURGICAL HISTORY           Procedure Laterality Date    CARDIAC SURGERY      COLONOSCOPY N/A 2019    COLONOSCOPY WITH BIOPSY performed by Javi Dangelo Jr., DO at [facility] ENDOSCOPY       CURRENT MEDICATIONS     [unfilled]    ALLERGIES     Patient has no known allergies.    FAMILY HISTORY           Problem Relation Age of Onset    Diabetes Mother     Heart Disease Father      Family Status   Relation Name Status    Mother      Father     No partnership data on file        SOCIAL HISTORY      reports that he has been smoking cigars. He has never used smokeless tobacco. He reports current alcohol use.    PHYSICAL EXAM    (up to 7 for level 4, 8 or more for level 5)     ED Triage Vitals   Encounter Vitals Group      BP       Systolic BP Percentile       Diastolic BP Percentile       Pulse       Resp       Temp       Temp src       SpO2       Weight       Height       Head Circumference       Peak Flow

## 2025-02-22 NOTE — ED NOTES
Spoke with [facility], [phone] rosenbacher. All questions answered at this time.  home aware that patient is active coroners case

## 2025-02-24 LAB — PATH INTERP BLD-IMP: NORMAL

## (undated) DEVICE — FORCEPS BX 240CM 2.4MM L NDL RAD JAW 4 M00513334